# Patient Record
(demographics unavailable — no encounter records)

---

## 2025-03-07 NOTE — HISTORY OF PRESENT ILLNESS
[de-identified] : 65-year-old woman returns for interval follow-up status post a reduction droll fixation intra-articular left supracondylar femur fracture on January 5, 2025 sustained as result of a ground-level fall.  There was extensive intra-articular extension at the time of open reduction internal fixation.  Patient returns today at a skilled nursing facility.  She remains nonweightbearing.  She feels weak.  She reports pain.  But there is no report of fevers or drainage.

## 2025-03-07 NOTE — ASSESSMENT
[FreeTextEntry1] : 65-year-old woman 2-month status post open reduction internal fixation of a left femur fracture.  Fracture has healed sufficiently that the patient can now weight-bear as tolerated on her left leg.  Therapy is going to work on mobilization.  Therapy should work on active and active assisted range of motion of her knee with gentle passive stretching.  Therapy will work on terminal eccentric quadricep and VMO strengthening generalized knee strengthening hip strengthening core strengthening gait training and balance.  I will see the patient back in my office in 2 months for repeat evaluation with radiographs of her left femur and of her left knee.  Spencer catheters should not be attached to the affected left lower extremity.  Please remove from left lower extremity.  Finally recommend transitioning off narcotic medication.  Can utilize nonsteroidal anti-inflammatory medication in addition to Tylenol.

## 2025-03-07 NOTE — IMAGING
[de-identified] : Middle-age woman sits comfortably in her stretcher.  She is in no distress.  Physical examination: Left knee: Surgical incisions have healed.  No erythema duration or fluctuance.  Essentially no range of motion of her knee.  Calf is soft no cords.  Able to actively dorsiflex and plantarflex her ankle.  Radiographs: Left femur (AP, lateral): Well aligned supracondylar femur fracture.  Hardware remains in place no evidence of loosening.
No

## 2025-03-17 NOTE — CARDIOLOGY SUMMARY
[de-identified] : TTE 01/10/2025: EF 60%, mild LVH, mod increased LV wall thickness, mod TR, PASP 55.7 mmHg, RA 15 mmHg.

## 2025-03-17 NOTE — HISTORY OF PRESENT ILLNESS
[FreeTextEntry1] :  is 65 y.o. 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 Patient had prolonged admission at [facility] 01/29-02/21/2025 for colorectal pain and c.diff treatment.   As of 03/10/2025 Hg 12.1 K 3.9 Cr 0.6 BUN 6  proBNP 1575 as of 01/2025